# Patient Record
Sex: FEMALE | URBAN - METROPOLITAN AREA
[De-identification: names, ages, dates, MRNs, and addresses within clinical notes are randomized per-mention and may not be internally consistent; named-entity substitution may affect disease eponyms.]

---

## 2022-03-31 ENCOUNTER — INPATIENT (INPATIENT)
Facility: HOSPITAL | Age: 67
LOS: 0 days | Discharge: HOME | DRG: 641 | End: 2022-04-01
Attending: HOSPITALIST | Admitting: HOSPITALIST
Payer: COMMERCIAL

## 2022-03-31 VITALS
TEMPERATURE: 98 F | HEART RATE: 72 BPM | WEIGHT: 279.99 LBS | OXYGEN SATURATION: 96 % | RESPIRATION RATE: 15 BRPM | SYSTOLIC BLOOD PRESSURE: 145 MMHG | HEIGHT: 64 IN | DIASTOLIC BLOOD PRESSURE: 77 MMHG

## 2022-03-31 DIAGNOSIS — R77.8 OTHER SPECIFIED ABNORMALITIES OF PLASMA PROTEINS: ICD-10-CM

## 2022-03-31 DIAGNOSIS — I10 ESSENTIAL (PRIMARY) HYPERTENSION: ICD-10-CM

## 2022-03-31 DIAGNOSIS — R55 SYNCOPE AND COLLAPSE: ICD-10-CM

## 2022-03-31 DIAGNOSIS — E78.5 HYPERLIPIDEMIA, UNSPECIFIED: ICD-10-CM

## 2022-03-31 LAB
ALBUMIN SERPL ELPH-MCNC: 3.6 G/DL — SIGNIFICANT CHANGE UP (ref 3.4–5)
ALP SERPL-CCNC: 88 U/L — SIGNIFICANT CHANGE UP (ref 40–120)
ALT FLD-CCNC: 30 U/L — SIGNIFICANT CHANGE UP (ref 12–42)
ANION GAP SERPL CALC-SCNC: 8 MMOL/L — LOW (ref 9–16)
AST SERPL-CCNC: 28 U/L — SIGNIFICANT CHANGE UP (ref 15–37)
BASOPHILS # BLD AUTO: 0.03 K/UL — SIGNIFICANT CHANGE UP (ref 0–0.2)
BASOPHILS NFR BLD AUTO: 0.3 % — SIGNIFICANT CHANGE UP (ref 0–2)
BILIRUB SERPL-MCNC: 0.3 MG/DL — SIGNIFICANT CHANGE UP (ref 0.2–1.2)
BUN SERPL-MCNC: 16 MG/DL — SIGNIFICANT CHANGE UP (ref 7–23)
CALCIUM SERPL-MCNC: 9.2 MG/DL — SIGNIFICANT CHANGE UP (ref 8.5–10.5)
CHLORIDE SERPL-SCNC: 103 MMOL/L — SIGNIFICANT CHANGE UP (ref 96–108)
CO2 SERPL-SCNC: 27 MMOL/L — SIGNIFICANT CHANGE UP (ref 22–31)
CREAT SERPL-MCNC: 0.75 MG/DL — SIGNIFICANT CHANGE UP (ref 0.5–1.3)
D DIMER BLD IA.RAPID-MCNC: <187 NG/ML DDU — SIGNIFICANT CHANGE UP
EGFR: 88 ML/MIN/1.73M2 — SIGNIFICANT CHANGE UP
EOSINOPHIL # BLD AUTO: 0.08 K/UL — SIGNIFICANT CHANGE UP (ref 0–0.5)
EOSINOPHIL NFR BLD AUTO: 0.8 % — SIGNIFICANT CHANGE UP (ref 0–6)
GLUCOSE SERPL-MCNC: 112 MG/DL — HIGH (ref 70–99)
HCT VFR BLD CALC: 42.2 % — SIGNIFICANT CHANGE UP (ref 34.5–45)
HGB BLD-MCNC: 13.9 G/DL — SIGNIFICANT CHANGE UP (ref 11.5–15.5)
IMM GRANULOCYTES NFR BLD AUTO: 0.2 % — SIGNIFICANT CHANGE UP (ref 0–1.5)
LACTATE SERPL-SCNC: 1 MMOL/L — SIGNIFICANT CHANGE UP (ref 0.4–2)
LIDOCAIN IGE QN: 96 U/L — SIGNIFICANT CHANGE UP (ref 73–393)
LYMPHOCYTES # BLD AUTO: 1.36 K/UL — SIGNIFICANT CHANGE UP (ref 1–3.3)
LYMPHOCYTES # BLD AUTO: 14 % — SIGNIFICANT CHANGE UP (ref 13–44)
MAGNESIUM SERPL-MCNC: 1.7 MG/DL — SIGNIFICANT CHANGE UP (ref 1.6–2.6)
MCHC RBC-ENTMCNC: 27.9 PG — SIGNIFICANT CHANGE UP (ref 27–34)
MCHC RBC-ENTMCNC: 32.9 GM/DL — SIGNIFICANT CHANGE UP (ref 32–36)
MCV RBC AUTO: 84.6 FL — SIGNIFICANT CHANGE UP (ref 80–100)
MONOCYTES # BLD AUTO: 0.6 K/UL — SIGNIFICANT CHANGE UP (ref 0–0.9)
MONOCYTES NFR BLD AUTO: 6.2 % — SIGNIFICANT CHANGE UP (ref 2–14)
NEUTROPHILS # BLD AUTO: 7.6 K/UL — HIGH (ref 1.8–7.4)
NEUTROPHILS NFR BLD AUTO: 78.5 % — HIGH (ref 43–77)
NRBC # BLD: 0 /100 WBCS — SIGNIFICANT CHANGE UP (ref 0–0)
PLATELET # BLD AUTO: 226 K/UL — SIGNIFICANT CHANGE UP (ref 150–400)
POTASSIUM SERPL-MCNC: 3.8 MMOL/L — SIGNIFICANT CHANGE UP (ref 3.5–5.3)
POTASSIUM SERPL-SCNC: 3.8 MMOL/L — SIGNIFICANT CHANGE UP (ref 3.5–5.3)
PROT SERPL-MCNC: 6.4 G/DL — SIGNIFICANT CHANGE UP (ref 6.4–8.2)
RBC # BLD: 4.99 M/UL — SIGNIFICANT CHANGE UP (ref 3.8–5.2)
RBC # FLD: 13.1 % — SIGNIFICANT CHANGE UP (ref 10.3–14.5)
SARS-COV-2 RNA SPEC QL NAA+PROBE: SIGNIFICANT CHANGE UP
SODIUM SERPL-SCNC: 138 MMOL/L — SIGNIFICANT CHANGE UP (ref 132–145)
TROPONIN I, HIGH SENSITIVITY RESULT: 95.4 NG/L — HIGH
TROPONIN I, HIGH SENSITIVITY RESULT: 98.9 NG/L — HIGH
WBC # BLD: 9.69 K/UL — SIGNIFICANT CHANGE UP (ref 3.8–10.5)
WBC # FLD AUTO: 9.69 K/UL — SIGNIFICANT CHANGE UP (ref 3.8–10.5)

## 2022-03-31 PROCEDURE — 99285 EMERGENCY DEPT VISIT HI MDM: CPT

## 2022-03-31 PROCEDURE — 71045 X-RAY EXAM CHEST 1 VIEW: CPT | Mod: 26

## 2022-03-31 PROCEDURE — 93010 ELECTROCARDIOGRAM REPORT: CPT

## 2022-03-31 PROCEDURE — 99223 1ST HOSP IP/OBS HIGH 75: CPT

## 2022-03-31 RX ORDER — ASPIRIN/CALCIUM CARB/MAGNESIUM 324 MG
325 TABLET ORAL ONCE
Refills: 0 | Status: COMPLETED | OUTPATIENT
Start: 2022-03-31 | End: 2022-03-31

## 2022-03-31 RX ORDER — SODIUM CHLORIDE 9 MG/ML
1000 INJECTION INTRAMUSCULAR; INTRAVENOUS; SUBCUTANEOUS ONCE
Refills: 0 | Status: COMPLETED | OUTPATIENT
Start: 2022-03-31 | End: 2022-03-31

## 2022-03-31 RX ADMIN — Medication 325 MILLIGRAM(S): at 20:17

## 2022-03-31 RX ADMIN — SODIUM CHLORIDE 2000 MILLILITER(S): 9 INJECTION INTRAMUSCULAR; INTRAVENOUS; SUBCUTANEOUS at 19:34

## 2022-03-31 NOTE — H&P ADULT - NSHPLABSRESULTS_GEN_ALL_CORE
13.9   9.69  )-----------( 226      ( 31 Mar 2022 19:36 )             42.2       03-31    138  |  103  |  16  ----------------------------<  112<H>  3.8   |  27  |  0.75    Ca    9.2      31 Mar 2022 19:36  Mg     1.7     03-31    TPro  6.4  /  Alb  3.6  /  TBili  0.3  /  DBili  x   /  AST  28  /  ALT  30  /  AlkPhos  88  03-31                    EKG: NSR@71bpm with 1st degree AVB, LVH and  non specific ST-T wave changes

## 2022-03-31 NOTE — H&P ADULT - PROBLEM SELECTOR PLAN 4
continue HOME MED:  --F/U lipid panel. continue HOME MED: Simvastatin 20mg PO qhs.  --F/U lipid panel.

## 2022-03-31 NOTE — H&P ADULT - NSICDXPASTSURGICALHX_GEN_ALL_CORE_FT
PAST SURGICAL HISTORY:  No significant past surgical history      PAST SURGICAL HISTORY:  S/P cholecystectomy     S/P rotator cuff repair

## 2022-03-31 NOTE — H&P ADULT - PROBLEM SELECTOR PLAN 2
Troponin I elevated 98.9 with second set 95.4, patient remains chest pain free.  --will obtain repeat Cardiac enzymes including Troponin T, CK and CKMB upon arrival to [facility].  --F/U Echo results. Troponin I elevated 98.9 with second set 95.4, patient remains chest pain free. EKG nonischemic.  --will obtain repeat Cardiac enzymes including Troponin T, CK and CKMB upon arrival to [facility].  --F/U Echo results.

## 2022-03-31 NOTE — H&P ADULT - NSHPSOCIALHISTORY_GEN_ALL_CORE
Tobacco/ETOH/illicit drugs: denies Tobacco: former smoker  ETOH: social drinker  illicit drugs: denies

## 2022-03-31 NOTE — ED PROVIDER NOTE - ATTENDING CONTRIBUTION TO CARE
I have seen the pt, reviewed all pertinent clinical data, and I agree with the documentation/care/plan executed by JULITO Lakhani.

## 2022-03-31 NOTE — H&P ADULT - NSHPPHYSICALEXAM_GEN_ALL_CORE
T(C): 36.6 (03-31-22 @ 21:54), Max: 36.9 (03-31-22 @ 18:59)  HR: 68 (03-31-22 @ 21:54) (68 - 72)  BP: 145/83 (03-31-22 @ 21:54) (145/77 - 145/83)  RR: 16 (03-31-22 @ 21:54) (15 - 16)  SpO2: 95% (03-31-22 @ 21:54) (95% - 96%)  Wt(kg): --    Appearance: Normal	  HEENT:   Normal oral mucosa, PERRL, EOMI	  Neck: Supple, + JVD/ - JVD; No Carotid Bruit and 2+ pulses B/L  Cardiovascular: Normal S1 S2, No JVD, No murmurs  Respiratory: Lungs clear to auscultation/Decreased Breath Sounds/No Rales, Rhonchi, Wheezing	  Gastrointestinal:  Soft, Non-tender, + BS	  Skin: No rashes, No ecchymoses, No cyanosis  Extremities: Normal range of motion, No clubbing, cyanosis or edema  Vascular: Femoral pulses 2+ b/l without bruit, DP 1+ b/l, PT 1+ b/l  Neurologic: Non-focal  Psychiatry: A & O x 3, Mood & affect appropriate

## 2022-03-31 NOTE — H&P ADULT - NSICDXPASTMEDICALHX_GEN_ALL_CORE_FT
PAST MEDICAL HISTORY:  Diverticulitis     History of depression     Hyperlipidemia     Hypertension      PAST MEDICAL HISTORY:  ASD (atrial septal defect)     Diverticulitis     History of depression     Hyperlipidemia     Hypertension

## 2022-03-31 NOTE — H&P ADULT - PROBLEM SELECTOR PLAN 3
stable, continue HOME MEDS: stable, continue HOME MEDS: Enalapril 20mg PO daily and HCTZ 12.5mg PO daily (on Bendroflumethiazide 2.5mg PO daily at home--not on formulary)

## 2022-03-31 NOTE — H&P ADULT - HISTORY OF PRESENT ILLNESS
65 y/o female visiting from [place] since 3/14/22 with PMHx of Diverticulitis, HTN, Hyperlipidemia, and Depression presents as a transfer this morning, 4/1/22, to [facility] under cardiology after two witnessed syncopal episodes with LOC lasting 30 seconds each with prodrome symptoms of weakness and lightheadedness.       In speaking with patient, she reports eating a meal at a restaurant when she felt that she had the urge to have a bowlmovement and had at least 5 episodes of diarrhea. states that after episodes patient felt weak and light headed. as she and her daughter were taking the elevator to exit the restaurant she felt light headed and syncopized for a few seconds. she came to again and had another syncopal episodes lasting a few seconds on the street. patient's daughter as able to lower her to the floor before she could fall. denies chestpain, palpitations, SOB, abdominal pain, nausea, vomiting.  3 aspirins on the day of her trip but does not take 66 yr old F, visiting from [place] since 3/14/22 with PMHx of HTN, hyperlipidemia, depression, diverticulitis who presents as a transfer this morning, 4/1/22, to [facility] under cardiology after two witnessed syncopal episodes with LOC lasting 30 seconds each with prodrome symptoms of weakness and lightheadedness.       In speaking with patient, she reports eating a meal at a restaurant when she felt that she had the urge to have a bowel movement and had at least 5 episodes of diarrhea. Patient recalls walking to the elevator to exit the restaurant when she felt weak and lightheaded, per patients daughter patient syncopized and had LOC for a few seconds. Patient came to again and had another syncopal episodes lasting a few seconds on the street. As per patient's daughter, each time she was able to lower her to the floor before she could fall. Patient denies any N/V, diaphoresis, palpitations, chest pain, SOB, abdominal pain or similar symptoms in the past.      [facility]77, HR: 70s, RR:16, Temp: 98.4F, O2 sat: 96% RA. EKG revealed NSR@71BPM, no acute ST/T wave changes. CXR unremarkable. Labs notable for: D-dimer <187, Troponin I elevated 98.9 with second set 95.4, lactate within normal limits, COVID PCR negative.     Patient treated with 1 liter IVF bolus and ASA 325mg PO x 1 dose.    Patient currently stable, transferred to [facility] for cardiac telemetry, serial cardiac enzymes and further cardiac work-up.    66 yr old F, visiting from [place] since 3/14/22 with PMHx of HTN, hyperlipidemia, DM, asthma (denies hospitalization/intubations), depression, hx of diverticulitis, hx ASD (spontaneously closed in her 20s), recent cholecystectomy 11/2021 who presents as a transfer this morning, 4/1/22, to [facility] under cardiology after two witnessed syncopal episodes with LOC lasting 30 seconds each with prodrome symptoms of weakness and lightheadedness.       In speaking with patient, she reports eating a fatty/heavy meal at a restaurant this afternoon when she felt that she had the urge to have a bowel movement and had at least 5 episodes of diarrhea. Patient recalls walking to the elevator to exit the restaurant when she felt weak and lightheaded, per patients daughter patient syncopized and had LOC for a few seconds. Patient came to again and had another syncopal episodes lasting a few seconds on the street. As per patient's daughter, each time she was able to lower her to the floor before she could fall. Patient denies any N/V, diaphoresis, palpitations, chest pain, SOB, abdominal pain or similar symptoms in the past.      [facility]77, HR: 70s, RR:16, Temp: 98.4F, O2 sat: 96% RA. EKG revealed NSR@71BPM, no acute ST/T wave changes. CXR unremarkable. Labs notable for: D-dimer <187, Troponin I elevated 98.9 with second set 95.4, lactate within normal limits, COVID PCR negative. Patient treated with 1 liter IVF bolus and ASA 325mg PO x 1 dose.    Patient currently stable, transferred to [facility] for cardiac telemetry, serial cardiac enzymes and further cardiac work-up.    66 yr old F, visiting from [place] since 3/14/22 with PMHx of HTN, hyperlipidemia, DM, asthma (denies hospitalization/intubations), depression, hx of diverticulitis, hx ASD (spontaneously closed in her 20s), recent cholecystectomy 11/2021 who presents as a transfer this morning, 4/1/22, to [facility] under cardiology after two witnessed syncopal episodes with LOC lasting 30 seconds each with prodrome symptoms of weakness and lightheadedness.       In speaking with patient, she reports eating a fatty/heavy meal at a restaurant this afternoon when she felt that she had the urge to have a bowel movement and had at least 5 episodes of diarrhea. Patient recalls walking to the elevator to exit the restaurant when she felt weak and lightheaded, per patients daughter patient syncopized and had LOC for a few seconds. Patient came to again and had another syncopal episodes lasting a few seconds on the street. As per patient's daughter, each time she was able to lower her to the floor before she could fall. Patient denies any N/V, diaphoresis, palpitations, chest pain, SOB, abdominal pain or similar symptoms in the past. Patient denies prior cardiac work-up, states she only had a EKG prior to cholecystectomy in [facility]2021.      [facility]: [facility]77, HR: 70s, RR:16, Temp: 98.4F, O2 sat: 96% RA. EKG revealed NSR@71BPM, no acute ST/T wave changes. CXR unremarkable. Labs notable for: D-dimer <187, Troponin I elevated 98.9 with second set 95.4, lactate within normal limits, COVID PCR negative. Patient treated with 1 liter IVF bolus and ASA 325mg PO x 1 dose.    Patient currently stable, transferred to [facility] for cardiac telemetry, serial cardiac enzymes and further cardiac work-up.

## 2022-03-31 NOTE — ED PROVIDER NOTE - NS ED ATTENDING STATEMENT MOD
This was a shared visit with the MAYNOR. I reviewed and verified the documentation and independently performed the documented:

## 2022-03-31 NOTE — H&P ADULT - ASSESSMENT
66 yr old F, visiting from [place] since 3/14/22 with PMHx of HTN, hyperlipidemia, depression, diverticulitis who [facility] s/p two witnessed syncopal episodes with LOC lasting 30 seconds each with prodrome symptoms of weakness and lightheadedness, EKG nonischemic, transferred to [facility] for cardiac telemetry, serial cardiac enzymes and further cardiac work-up.    66 yr old F, visiting from [place] since 3/14/22 with PMHx of HTN, hyperlipidemia, DM, depression who initially presented to [facility] 3/31/22 s/p two witnessed syncopal episodes with LOC lasting 30 seconds each with prodrome symptoms of weakness and lightheadedness, EKG nonischemic, transferred to [facility] for cardiac telemetry, serial cardiac enzymes and further cardiac work-up.

## 2022-03-31 NOTE — ED PROVIDER NOTE - CLINICAL SUMMARY MEDICAL DECISION MAKING FREE TEXT BOX
Patient PMHX HTN, diverticulitis, cholecystectomy BIB EMS after 2 syncopal episodes in the setting of 5 episodes of watery diarrhea. will, check labs, ekg, give IV hydration and continue to monitor

## 2022-03-31 NOTE — ED ADULT NURSE NOTE - OBJECTIVE STATEMENT
Pt presents to ED sp two syncopal episodes after eating at a restaurant this evening. Pt endorses multiple episodes of diarrhea today as well. Hx of gallbladder removal in November and non compliant with low fat diet. Denies headstrike with syncope, CP, SOB, abd pain or dizziness.

## 2022-03-31 NOTE — ED ADULT TRIAGE NOTE - CHIEF COMPLAINT QUOTE
abdominal pain and diarrhea (intermittent episodes of diarrhea x 3-4 months), 2 witnessed syncopal episodes (apx 30 seconds each) at 1500 today (pmh of diverticulitis)

## 2022-03-31 NOTE — H&P ADULT - PROBLEM SELECTOR PLAN 1
currently asymptomatic, EKG revealed NSR@71BPM without acute ST/T wave changes. CXR unremarkable. D-dimer within normal limits.  --continue cardiac telemetry.  --F/U orthostatics.  --obtain Echocardiogram for structural assessment. currently asymptomatic, EKG revealed NSR@71BPM without acute ST/T wave changes. CXR unremarkable. D-dimer within normal limits.  --continue cardiac telemetry.  --F/U orthostatics.  --obtain Echocardiogram for structural assessment. (given reported hx of ASD)

## 2022-03-31 NOTE — H&P ADULT - PROBLEM SELECTOR PLAN 5
patient admits to 5 episodes of diarrhea after a meal this afternoon.  --afebrile, no leukocytosis, lactate within normal limits.  --will continue to monitor and consider stool culture PRN.      N: NPO pending further work-up  E: Maintain K >4.0 and Mg >2.0  VTE PPx: Lovenox subcut  Code: Full patient admits to 5 episodes of diarrhea after a meal this afternoon, states this is chronic and has been occurring after heavy/fatty meals since her cholecystectomy.  --afebrile, no leukocytosis, lactate within normal limits.  --will continue to monitor and consider stool culture PRN.      N: NPO pending further work-up  E: Maintain K >4.0 and Mg >2.0  VTE PPx: Lovenox subcut  Code: Full

## 2022-03-31 NOTE — ED PROVIDER NOTE - OBJECTIVE STATEMENT
PMHX diverticulitis, HTN, hyperlipidemia, depression, cholecystectomy BIB EMS for 2 sycnopal episodes. patient had been eating a meal at a restaurant when she felt that she had the urge to have a bowlmovement and had at least 5 episodes of diarrhea. states that after episodes patient felt weak and light headed. as she and her daughter were taking the elevator to exit the restaurant she felt light headed and syncopized for a few seconds. she came to again and had another syncopal episodes lasting a few seconds on the street. patient's daughter as able to lower her to the floor before she could fall. denies chestpain, palpitations, SOB, abdominal pain, nausea, vomiting. states that she is visiting from [place] arrived in  3/14/2022. had taken 3 aspirins on the day of her trip but does not take any on a regular basis.

## 2022-04-01 VITALS — TEMPERATURE: 98 F

## 2022-04-01 DIAGNOSIS — R19.7 DIARRHEA, UNSPECIFIED: ICD-10-CM

## 2022-04-01 DIAGNOSIS — Z90.49 ACQUIRED ABSENCE OF OTHER SPECIFIED PARTS OF DIGESTIVE TRACT: Chronic | ICD-10-CM

## 2022-04-01 DIAGNOSIS — Z98.890 OTHER SPECIFIED POSTPROCEDURAL STATES: Chronic | ICD-10-CM

## 2022-04-01 LAB
ANION GAP SERPL CALC-SCNC: 14 MMOL/L — SIGNIFICANT CHANGE UP (ref 5–17)
BUN SERPL-MCNC: 15 MG/DL — SIGNIFICANT CHANGE UP (ref 7–23)
CALCIUM SERPL-MCNC: 9.2 MG/DL — SIGNIFICANT CHANGE UP (ref 8.4–10.5)
CHLORIDE SERPL-SCNC: 104 MMOL/L — SIGNIFICANT CHANGE UP (ref 96–108)
CHOLEST SERPL-MCNC: 173 MG/DL — SIGNIFICANT CHANGE UP
CK MB CFR SERPL CALC: 7.4 NG/ML — HIGH (ref 0–6.7)
CK MB CFR SERPL CALC: 7.5 NG/ML — HIGH (ref 0–6.7)
CK SERPL-CCNC: 149 U/L — SIGNIFICANT CHANGE UP (ref 25–170)
CK SERPL-CCNC: 149 U/L — SIGNIFICANT CHANGE UP (ref 25–170)
CO2 SERPL-SCNC: 23 MMOL/L — SIGNIFICANT CHANGE UP (ref 22–31)
CREAT SERPL-MCNC: 0.63 MG/DL — SIGNIFICANT CHANGE UP (ref 0.5–1.3)
EGFR: 98 ML/MIN/1.73M2 — SIGNIFICANT CHANGE UP
GLUCOSE BLDC GLUCOMTR-MCNC: 106 MG/DL — HIGH (ref 70–99)
GLUCOSE BLDC GLUCOMTR-MCNC: 89 MG/DL — SIGNIFICANT CHANGE UP (ref 70–99)
GLUCOSE BLDC GLUCOMTR-MCNC: 94 MG/DL — SIGNIFICANT CHANGE UP (ref 70–99)
GLUCOSE SERPL-MCNC: 101 MG/DL — HIGH (ref 70–99)
HCT VFR BLD CALC: 41.2 % — SIGNIFICANT CHANGE UP (ref 34.5–45)
HDLC SERPL-MCNC: 59 MG/DL — SIGNIFICANT CHANGE UP
HGB BLD-MCNC: 13.3 G/DL — SIGNIFICANT CHANGE UP (ref 11.5–15.5)
LIPID PNL WITH DIRECT LDL SERPL: 92 MG/DL — SIGNIFICANT CHANGE UP
MAGNESIUM SERPL-MCNC: 1.7 MG/DL — SIGNIFICANT CHANGE UP (ref 1.6–2.6)
MCHC RBC-ENTMCNC: 27.3 PG — SIGNIFICANT CHANGE UP (ref 27–34)
MCHC RBC-ENTMCNC: 32.3 GM/DL — SIGNIFICANT CHANGE UP (ref 32–36)
MCV RBC AUTO: 84.6 FL — SIGNIFICANT CHANGE UP (ref 80–100)
NON HDL CHOLESTEROL: 114 MG/DL — SIGNIFICANT CHANGE UP
NRBC # BLD: 0 /100 WBCS — SIGNIFICANT CHANGE UP (ref 0–0)
PLATELET # BLD AUTO: 228 K/UL — SIGNIFICANT CHANGE UP (ref 150–400)
POTASSIUM SERPL-MCNC: 3.7 MMOL/L — SIGNIFICANT CHANGE UP (ref 3.5–5.3)
POTASSIUM SERPL-SCNC: 3.7 MMOL/L — SIGNIFICANT CHANGE UP (ref 3.5–5.3)
RBC # BLD: 4.87 M/UL — SIGNIFICANT CHANGE UP (ref 3.8–5.2)
RBC # FLD: 13 % — SIGNIFICANT CHANGE UP (ref 10.3–14.5)
SODIUM SERPL-SCNC: 141 MMOL/L — SIGNIFICANT CHANGE UP (ref 135–145)
TRIGL SERPL-MCNC: 108 MG/DL — SIGNIFICANT CHANGE UP
TROPONIN T SERPL-MCNC: 0.01 NG/ML — SIGNIFICANT CHANGE UP (ref 0–0.01)
TROPONIN T SERPL-MCNC: <0.01 NG/ML — SIGNIFICANT CHANGE UP (ref 0–0.01)
TSH SERPL-MCNC: 1.77 UIU/ML — SIGNIFICANT CHANGE UP (ref 0.27–4.2)
WBC # BLD: 7.42 K/UL — SIGNIFICANT CHANGE UP (ref 3.8–10.5)
WBC # FLD AUTO: 7.42 K/UL — SIGNIFICANT CHANGE UP (ref 3.8–10.5)

## 2022-04-01 PROCEDURE — 84484 ASSAY OF TROPONIN QUANT: CPT

## 2022-04-01 PROCEDURE — 84443 ASSAY THYROID STIM HORMONE: CPT

## 2022-04-01 PROCEDURE — 71045 X-RAY EXAM CHEST 1 VIEW: CPT

## 2022-04-01 PROCEDURE — 83735 ASSAY OF MAGNESIUM: CPT

## 2022-04-01 PROCEDURE — 85025 COMPLETE CBC W/AUTO DIFF WBC: CPT

## 2022-04-01 PROCEDURE — 80048 BASIC METABOLIC PNL TOTAL CA: CPT

## 2022-04-01 PROCEDURE — 80061 LIPID PANEL: CPT

## 2022-04-01 PROCEDURE — 83605 ASSAY OF LACTIC ACID: CPT

## 2022-04-01 PROCEDURE — 85027 COMPLETE CBC AUTOMATED: CPT

## 2022-04-01 PROCEDURE — 82550 ASSAY OF CK (CPK): CPT

## 2022-04-01 PROCEDURE — 99285 EMERGENCY DEPT VISIT HI MDM: CPT

## 2022-04-01 PROCEDURE — 93306 TTE W/DOPPLER COMPLETE: CPT

## 2022-04-01 PROCEDURE — 99239 HOSP IP/OBS DSCHRG MGMT >30: CPT

## 2022-04-01 PROCEDURE — 83690 ASSAY OF LIPASE: CPT

## 2022-04-01 PROCEDURE — 85379 FIBRIN DEGRADATION QUANT: CPT

## 2022-04-01 PROCEDURE — 80053 COMPREHEN METABOLIC PANEL: CPT

## 2022-04-01 PROCEDURE — 82962 GLUCOSE BLOOD TEST: CPT

## 2022-04-01 PROCEDURE — 93005 ELECTROCARDIOGRAM TRACING: CPT

## 2022-04-01 PROCEDURE — 93306 TTE W/DOPPLER COMPLETE: CPT | Mod: 26

## 2022-04-01 PROCEDURE — 87635 SARS-COV-2 COVID-19 AMP PRB: CPT

## 2022-04-01 PROCEDURE — 82553 CREATINE MB FRACTION: CPT

## 2022-04-01 PROCEDURE — 36415 COLL VENOUS BLD VENIPUNCTURE: CPT

## 2022-04-01 RX ORDER — GLUCAGON INJECTION, SOLUTION 0.5 MG/.1ML
1 INJECTION, SOLUTION SUBCUTANEOUS ONCE
Refills: 0 | Status: DISCONTINUED | OUTPATIENT
Start: 2022-04-01 | End: 2022-04-01

## 2022-04-01 RX ORDER — METFORMIN HYDROCHLORIDE 850 MG/1
1 TABLET ORAL
Qty: 0 | Refills: 0 | DISCHARGE

## 2022-04-01 RX ORDER — DEXTROSE 50 % IN WATER 50 %
25 SYRINGE (ML) INTRAVENOUS ONCE
Refills: 0 | Status: DISCONTINUED | OUTPATIENT
Start: 2022-04-01 | End: 2022-04-01

## 2022-04-01 RX ORDER — SIMVASTATIN 20 MG/1
1 TABLET, FILM COATED ORAL
Qty: 0 | Refills: 0 | DISCHARGE

## 2022-04-01 RX ORDER — CHOLECALCIFEROL (VITAMIN D3) 125 MCG
2000 CAPSULE ORAL DAILY
Refills: 0 | Status: DISCONTINUED | OUTPATIENT
Start: 2022-04-01 | End: 2022-04-01

## 2022-04-01 RX ORDER — DEXTROSE 50 % IN WATER 50 %
15 SYRINGE (ML) INTRAVENOUS ONCE
Refills: 0 | Status: DISCONTINUED | OUTPATIENT
Start: 2022-04-01 | End: 2022-04-01

## 2022-04-01 RX ORDER — INSULIN LISPRO 100/ML
VIAL (ML) SUBCUTANEOUS
Refills: 0 | Status: DISCONTINUED | OUTPATIENT
Start: 2022-04-01 | End: 2022-04-01

## 2022-04-01 RX ORDER — OMEPRAZOLE 10 MG/1
1 CAPSULE, DELAYED RELEASE ORAL
Qty: 0 | Refills: 0 | DISCHARGE

## 2022-04-01 RX ORDER — DEXTROSE 50 % IN WATER 50 %
12.5 SYRINGE (ML) INTRAVENOUS ONCE
Refills: 0 | Status: DISCONTINUED | OUTPATIENT
Start: 2022-04-01 | End: 2022-04-01

## 2022-04-01 RX ORDER — SODIUM CHLORIDE 9 MG/ML
1000 INJECTION, SOLUTION INTRAVENOUS
Refills: 0 | Status: DISCONTINUED | OUTPATIENT
Start: 2022-04-01 | End: 2022-04-01

## 2022-04-01 RX ORDER — IPRATROPIUM BROMIDE 0.2 MG/ML
2 SOLUTION, NON-ORAL INHALATION
Qty: 0 | Refills: 0 | DISCHARGE

## 2022-04-01 RX ORDER — PANTOPRAZOLE SODIUM 20 MG/1
40 TABLET, DELAYED RELEASE ORAL
Refills: 0 | Status: DISCONTINUED | OUTPATIENT
Start: 2022-04-01 | End: 2022-04-01

## 2022-04-01 RX ORDER — POTASSIUM CHLORIDE 20 MEQ
40 PACKET (EA) ORAL ONCE
Refills: 0 | Status: COMPLETED | OUTPATIENT
Start: 2022-04-01 | End: 2022-04-01

## 2022-04-01 RX ORDER — MECLIZINE HCL 12.5 MG
1 TABLET ORAL
Qty: 90 | Refills: 0
Start: 2022-04-01 | End: 2022-04-30

## 2022-04-01 RX ORDER — MAGNESIUM SULFATE 500 MG/ML
2 VIAL (ML) INJECTION ONCE
Refills: 0 | Status: COMPLETED | OUTPATIENT
Start: 2022-04-01 | End: 2022-04-01

## 2022-04-01 RX ORDER — CHOLESTYRAMINE 4 G/9G
4 POWDER, FOR SUSPENSION ORAL
Qty: 120 | Refills: 0
Start: 2022-04-01 | End: 2022-04-30

## 2022-04-01 RX ORDER — ALBUTEROL 90 UG/1
2 AEROSOL, METERED ORAL EVERY 6 HOURS
Refills: 0 | Status: DISCONTINUED | OUTPATIENT
Start: 2022-04-01 | End: 2022-04-01

## 2022-04-01 RX ORDER — HYDROCHLOROTHIAZIDE 25 MG
12.5 TABLET ORAL DAILY
Refills: 0 | Status: DISCONTINUED | OUTPATIENT
Start: 2022-04-01 | End: 2022-04-01

## 2022-04-01 RX ORDER — ENOXAPARIN SODIUM 100 MG/ML
40 INJECTION SUBCUTANEOUS EVERY 12 HOURS
Refills: 0 | Status: DISCONTINUED | OUTPATIENT
Start: 2022-04-01 | End: 2022-04-01

## 2022-04-01 RX ORDER — SIMVASTATIN 20 MG/1
20 TABLET, FILM COATED ORAL AT BEDTIME
Refills: 0 | Status: DISCONTINUED | OUTPATIENT
Start: 2022-04-01 | End: 2022-04-01

## 2022-04-01 RX ORDER — CHOLESTYRAMINE 4 G/9G
4 POWDER, FOR SUSPENSION ORAL DAILY
Refills: 0 | Status: DISCONTINUED | OUTPATIENT
Start: 2022-04-01 | End: 2022-04-01

## 2022-04-01 RX ORDER — FLUOXETINE HCL 10 MG
1 CAPSULE ORAL
Qty: 0 | Refills: 0 | DISCHARGE

## 2022-04-01 RX ORDER — MECLIZINE HCL 12.5 MG
25 TABLET ORAL THREE TIMES A DAY
Refills: 0 | Status: DISCONTINUED | OUTPATIENT
Start: 2022-04-01 | End: 2022-04-01

## 2022-04-01 RX ORDER — ALBUTEROL 90 UG/1
2 AEROSOL, METERED ORAL
Qty: 0 | Refills: 0 | DISCHARGE

## 2022-04-01 RX ORDER — CHOLECALCIFEROL (VITAMIN D3) 125 MCG
1 CAPSULE ORAL
Qty: 0 | Refills: 0 | DISCHARGE

## 2022-04-01 RX ADMIN — Medication 25 GRAM(S): at 10:08

## 2022-04-01 RX ADMIN — Medication 12.5 MILLIGRAM(S): at 05:31

## 2022-04-01 RX ADMIN — ENOXAPARIN SODIUM 40 MILLIGRAM(S): 100 INJECTION SUBCUTANEOUS at 05:31

## 2022-04-01 RX ADMIN — Medication 40 MILLIEQUIVALENT(S): at 10:08

## 2022-04-01 RX ADMIN — PANTOPRAZOLE SODIUM 40 MILLIGRAM(S): 20 TABLET, DELAYED RELEASE ORAL at 05:31

## 2022-04-01 RX ADMIN — CHOLESTYRAMINE 4 GRAM(S): 4 POWDER, FOR SUSPENSION ORAL at 15:58

## 2022-04-01 RX ADMIN — Medication 20 MILLIGRAM(S): at 05:31

## 2022-04-01 RX ADMIN — Medication 2000 UNIT(S): at 13:22

## 2022-04-01 NOTE — DISCHARGE NOTE PROVIDER - HOSPITAL COURSE
66 yr old F, visiting from [place] since 3/14/22 with PMHx of HTN, hyperlipidemia, DM, asthma (denies hospitalization/intubations), depression, hx of diverticulitis, hx ASD (spontaneously closed in her 20s), recent cholecystectomy 11/2021 presented to [facility] from [facility] 4/1/22 s/p two witnessed syncopal episodes with LOC lasting 30 seconds each with prodrome symptoms of weakness and lightheadedness.   In [facility], BP: 145/77, HR: 70s, RR:16, Temp: 98.4F, O2 sat: 96% RA. EKG revealed NSR@71BPM, no acute ST/T wave changes. CXR unremarkable. Labs notable for: D-dimer <187, Troponin I elevated 98.9 with second set 95.4, lactate within normal limits, COVID PCR negative. Patient treated with 1 liter IVF bolus and ASA 325mg PO x 1 dose.    During hospitalization, Trop (-) x ___, orthostatics negative. ECHO performed s/f ____    Syncope most likely 2/2 dehydration s/p 5 episode of diarrhea and she remains hemodynamically stable as no arrhthymias noted on tele.     On the day of discharge, the patient was seen and examined. Symptoms improved. Vital signs are stable. Labs and imaging reviewed. Patient is medically optimized and hemodynamically stable. Return precautions discussed, medication teach back done w/ patient, and importance of physician followup emphasized for which she verbalized understanding.        66 yr old F, visiting from [place] since 3/14/22 with PMHx of HTN, hyperlipidemia, DM, asthma (denies hospitalization/intubations), depression, hx of diverticulitis, hx ASD (spontaneously closed in her 20s), recent cholecystectomy 11/2021 presented to [facility] from [facility] 4/1/22 s/p two witnessed syncopal episodes with LOC lasting 30 seconds each with prodrome symptoms of weakness and lightheadedness.   In [facility], BP: 145/77, HR: 70s, RR:16, Temp: 98.4F, O2 sat: 96% RA. EKG revealed NSR@71BPM, no acute ST/T wave changes. CXR unremarkable. Labs notable for: D-dimer <187, Troponin I elevated 98.9 with second set 95.4, lactate within normal limits, COVID PCR negative. Patient treated with 1 liter IVF bolus and ASA 325mg PO x 1 dose.    During hospitalization, Trop (-) x 2, orthostatics negative. ECHO performed s/f ____    Syncope most likely 2/2 dehydration s/p 5 episode of diarrhea and she remains hemodynamically stable as no arrhthymias noted on tele.     On the day of discharge, the patient was seen and examined. Symptoms improved. Vital signs are stable. Labs and imaging reviewed. Patient is medically optimized and hemodynamically stable. Return precautions discussed, medication teach back done w/ patient, and importance of physician followup emphasized for which she verbalized understanding.        66 yr old F, visiting from [place] since 3/14/22 with PMHx of HTN, hyperlipidemia, DM, asthma (denies hospitalization/intubations), depression, hx of diverticulitis, hx ASD (spontaneously closed in her 20s), recent cholecystectomy 11/2021 presented to [facility] from [facility] 4/1/22 s/p two witnessed syncopal episodes with LOC lasting 30 seconds each with prodrome symptoms of weakness and lightheadedness.   In [facility], BP: 145/77, HR: 70s, RR:16, Temp: 98.4F, O2 sat: 96% RA. EKG revealed NSR@71BPM, no acute ST/T wave changes. CXR unremarkable. Labs notable for: D-dimer <187, Troponin I elevated 98.9 with second set 95.4, lactate within normal limits, COVID PCR negative. Patient treated with 1 liter IVF bolus and ASA 325mg PO x 1 dose.    During hospitalization, Trop (-) x 2, orthostatics negative. ECHO performed s/f Hyperdynamic left ventricular systolic function. Normal right ventricular size and systolic function. Normal atria. Mild aortic stenosis.  Syncope most likely 2/2 dehydration s/p 5 episode of diarrhea and she remains hemodynamically stable and has had no arrhthymias noted on tele.     On the day of discharge, the patient was seen and examined. Symptoms improved. Vital signs are stable. Labs and imaging reviewed. Patient is medically optimized and hemodynamically stable. Return precautions discussed, medication teach back done w/ patient, and importance of physician followup emphasized for which she verbalized understanding.

## 2022-04-01 NOTE — DISCHARGE NOTE PROVIDER - NSDCMRMEDTOKEN_GEN_ALL_CORE_FT
Albuterol (Eqv-ProAir HFA) 90 mcg/inh inhalation aerosol: 2 puff(s) inhaled every 6 hours, As Needed  enalapril 20 mg oral tablet: 1 tab(s) orally once a day  FLUoxetine 20 mg oral capsule: 1 cap(s) orally once a day  hydroCHLOROthiazide 12.5 mg oral capsule: 1 cap(s) orally once a day (patient on bendroflumethiazide 2.5mg PO daily at home)  MetFORMIN (Eqv-Fortamet) 500 mg oral tablet, extended release: 1 tab(s) orally once a day  omeprazole 20 mg oral delayed release capsule: 1 cap(s) orally once a day  simvastatin 20 mg oral tablet: 1 tab(s) orally once a day (at bedtime)  Vitamin D3 50 mcg (2000 intl units) oral tablet: 1 tab(s) orally once a day   Albuterol (Eqv-ProAir HFA) 90 mcg/inh inhalation aerosol: 2 puff(s) inhaled every 6 hours, As Needed  cholestyramine 4 g/5 g oral powder for reconstitution: 4 gram(s) orally once a day   enalapril 20 mg oral tablet: 1 tab(s) orally once a day  FLUoxetine 20 mg oral capsule: 1 cap(s) orally once a day  hydroCHLOROthiazide 12.5 mg oral capsule: 1 cap(s) orally once a day (patient on bendroflumethiazide 2.5mg PO daily at home)  meclizine 25 mg oral tablet: 1 tab(s) orally 3 times a day, As needed, Dizziness  MetFORMIN (Eqv-Fortamet) 500 mg oral tablet, extended release: 1 tab(s) orally once a day  omeprazole 20 mg oral delayed release capsule: 1 cap(s) orally once a day  simvastatin 20 mg oral tablet: 1 tab(s) orally once a day (at bedtime)  Vitamin D3 50 mcg (2000 intl units) oral tablet: 1 tab(s) orally once a day

## 2022-04-01 NOTE — DISCHARGE NOTE PROVIDER - NSDCCPCAREPLAN_GEN_ALL_CORE_FT
PRINCIPAL DISCHARGE DIAGNOSIS  Diagnosis: Syncope  Assessment and Plan of Treatment: - You presented to the hospital after you had a syncopal episode or in other words fainted. Multiple tests were performed while you were in the hospital. You DID NOT have a heart attack and NO arrhythmias or irregular heart beats were noted on telemetry. An echocardiogram or ultrasound of your heart was performed which showed a normal pumping function or ejection fraction of your heart.   - Syncope happens when the brain temporarily doesn't get enough blood. One of the most common reasons this happens is called "vasovagal syncope." If you have vasovagal syncope, your body has a reaction in which your heart beats too slowly or your blood vessels expand (or both). This can happen for lots of different kinds of reasons. People can have vasovagal syncope if they are dehydrated or don't drink enough water. You most likely had this syncopal episode from dehydration after you had 5 episodes of diarrhea. Please stay hydrated and ensure you drink enough water.       PRINCIPAL DISCHARGE DIAGNOSIS  Diagnosis: Syncope  Assessment and Plan of Treatment: - You presented to the hospital after you had a syncopal episode or in other words fainted. Multiple tests were performed while you were in the hospital. You DID NOT have a heart attack and NO arrhythmias or irregular heart beats were noted on telemetry. An echocardiogram or ultrasound of your heart was performed which showed a normal pumping function or ejection fraction of your heart.   - Syncope happens when the brain temporarily doesn't get enough blood. One of the most common reasons this happens is called "vasovagal syncope." If you have vasovagal syncope, your body has a reaction in which your heart beats too slowly or your blood vessels expand (or both). This can happen for lots of different kinds of reasons. People can have vasovagal syncope if they are dehydrated or don't drink enough water. You most likely had this syncopal episode from dehydration after you had 5 episodes of diarrhea. Please stay hydrated and ensure you drink enough water.      SECONDARY DISCHARGE DIAGNOSES  Diagnosis: Vertigo  Assessment and Plan of Treatment: - You have a known history of vertigo which is a type of dizziness that makes you feel like you are spinning, swaying, or tilting, or like the room is moving around you. These feelings come and go, and might last seconds, hours, or days. You might feel worse when you move your head, change positions, cough, or sneeze.  - Please take Meclizine as needed for dizziness.     PRINCIPAL DISCHARGE DIAGNOSIS  Diagnosis: Syncope  Assessment and Plan of Treatment: - You presented to the hospital after you had a syncopal episode or in other words fainted. Multiple tests were performed while you were in the hospital. You DID NOT have a heart attack and NO arrhythmias or irregular heart beats were noted on telemetry. An echocardiogram or ultrasound of your heart was performed which showed a normal pumping function or ejection fraction of your heart.   - Syncope happens when the brain temporarily doesn't get enough blood. One of the most common reasons this happens is called "vasovagal syncope." If you have vasovagal syncope, your body has a reaction in which your heart beats too slowly or your blood vessels expand (or both). This can happen for lots of different kinds of reasons. People can have vasovagal syncope if they are dehydrated or don't drink enough water. You most likely had this syncopal episode from dehydration after you had 5 episodes of diarrhea. Please stay hydrated and ensure you drink enough water.      SECONDARY DISCHARGE DIAGNOSES  Diagnosis: Vertigo  Assessment and Plan of Treatment: - You have a known history of vertigo which is a type of dizziness that makes you feel like you are spinning, swaying, or tilting, or like the room is moving around you. These feelings come and go, and might last seconds, hours, or days. You might feel worse when you move your head, change positions, cough, or sneeze.  - Please take Meclizine as needed for dizziness.    Diagnosis: Diarrhea  Assessment and Plan of Treatment: You were started on a medication called Cholestyramine . This medication helps remove excess bile acid from the intestines, which relieves the symptoms of diarrhea associated. Please follow up with your GI doctor when you return to [place].

## 2022-04-01 NOTE — DISCHARGE NOTE NURSING/CASE MANAGEMENT/SOCIAL WORK - NSDCPEFALRISK_GEN_ALL_CORE
For information on Fall & Injury Prevention, visit: [email or web address] OR  [email or web address] OR  https://www.cdc.gov/steadi/patient.html

## 2022-04-01 NOTE — DISCHARGE NOTE NURSING/CASE MANAGEMENT/SOCIAL WORK - PATIENT PORTAL LINK FT
You can access the FollowMyHealth Patient Portal offered by [facility] by registering at the following website: [email or web address]. By joining [employer]’s FollowMyHealth portal, you will also be able to view your health information using other applications (apps) compatible with our system.

## 2022-04-01 NOTE — PATIENT PROFILE ADULT - FALL HARM RISK - HARM RISK INTERVENTIONS

## 2022-04-06 DIAGNOSIS — K52.9 NONINFECTIVE GASTROENTERITIS AND COLITIS, UNSPECIFIED: ICD-10-CM

## 2022-04-06 DIAGNOSIS — R77.8 OTHER SPECIFIED ABNORMALITIES OF PLASMA PROTEINS: ICD-10-CM

## 2022-04-06 DIAGNOSIS — I10 ESSENTIAL (PRIMARY) HYPERTENSION: ICD-10-CM

## 2022-04-06 DIAGNOSIS — Z87.891 PERSONAL HISTORY OF NICOTINE DEPENDENCE: ICD-10-CM

## 2022-04-06 DIAGNOSIS — Z79.51 LONG TERM (CURRENT) USE OF INHALED STEROIDS: ICD-10-CM

## 2022-04-06 DIAGNOSIS — J45.909 UNSPECIFIED ASTHMA, UNCOMPLICATED: ICD-10-CM

## 2022-04-06 DIAGNOSIS — Z79.84 LONG TERM (CURRENT) USE OF ORAL HYPOGLYCEMIC DRUGS: ICD-10-CM

## 2022-04-06 DIAGNOSIS — F32.A DEPRESSION, UNSPECIFIED: ICD-10-CM

## 2022-04-06 DIAGNOSIS — R42 DIZZINESS AND GIDDINESS: ICD-10-CM

## 2022-04-06 DIAGNOSIS — R55 SYNCOPE AND COLLAPSE: ICD-10-CM

## 2022-04-06 DIAGNOSIS — E11.9 TYPE 2 DIABETES MELLITUS WITHOUT COMPLICATIONS: ICD-10-CM

## 2022-04-06 DIAGNOSIS — E78.5 HYPERLIPIDEMIA, UNSPECIFIED: ICD-10-CM

## 2022-04-06 DIAGNOSIS — Z88.0 ALLERGY STATUS TO PENICILLIN: ICD-10-CM

## 2022-04-06 DIAGNOSIS — E86.0 DEHYDRATION: ICD-10-CM

## 2022-06-06 NOTE — DISCHARGE NOTE PROVIDER - NSDCHC_MEDRECSTATUS_GEN_ALL_CORE
Medical Necessity Information: It is in your best interest to select a reason for this procedure from the list below. All of these items fulfill various CMS LCD requirements except the new and changing color options. Show Topical Anesthesia Variable?: Yes Render Post-Care Instructions In Note?: no Admission Reconciliation is Completed  Discharge Reconciliation is Not Complete Detail Level: Simple Duration Of Freeze Thaw-Cycle (Seconds): 0 Admission Reconciliation is Completed  Discharge Reconciliation is Completed Consent: The patient's consent was obtained including but not limited to risks of crusting, scabbing, blistering, scarring, darker or lighter pigmentary change, recurrence, incomplete removal and infection. Post-Care Instructions: I reviewed with the patient in detail post-care instructions. Patient is to wear sunprotection, and avoid picking at any of the treated lesions. Pt may apply Vaseline to crusted or scabbing areas. Medical Necessity Clause: This procedure was medically necessary because the lesions that were treated were: Detail Level: Detailed Spray Paint Text: The liquid nitrogen was applied to the skin utilizing a spray paint frosting technique. Total Number Of Aks Treated: 2

## 2022-06-07 NOTE — ED ADULT NURSE NOTE - NSFALLRSKHARMRISK_ED_ALL_ED
Follow-up with your primary doctor.  Return to the emergency room for any new or worsening symptoms or if you have any other questions or concerns.  Take medication as prescribed.  
no

## 2023-01-09 ENCOUNTER — EMERGENCY (EMERGENCY)
Facility: HOSPITAL | Age: 68
LOS: 1 days | Discharge: HOME | End: 2023-01-09
Attending: EMERGENCY MEDICINE | Admitting: EMERGENCY MEDICINE
Payer: COMMERCIAL

## 2023-01-09 VITALS
RESPIRATION RATE: 18 BRPM | DIASTOLIC BLOOD PRESSURE: 55 MMHG | TEMPERATURE: 98 F | OXYGEN SATURATION: 99 % | SYSTOLIC BLOOD PRESSURE: 91 MMHG | HEART RATE: 70 BPM

## 2023-01-09 VITALS
HEART RATE: 72 BPM | RESPIRATION RATE: 17 BRPM | SYSTOLIC BLOOD PRESSURE: 145 MMHG | OXYGEN SATURATION: 99 % | DIASTOLIC BLOOD PRESSURE: 78 MMHG | TEMPERATURE: 98 F

## 2023-01-09 DIAGNOSIS — I44.0 ATRIOVENTRICULAR BLOCK, FIRST DEGREE: ICD-10-CM

## 2023-01-09 DIAGNOSIS — F32.A DEPRESSION, UNSPECIFIED: ICD-10-CM

## 2023-01-09 DIAGNOSIS — Z86.79 PERSONAL HISTORY OF OTHER DISEASES OF THE CIRCULATORY SYSTEM: ICD-10-CM

## 2023-01-09 DIAGNOSIS — Z82.49 FAMILY HISTORY OF ISCHEMIC HEART DISEASE AND OTHER DISEASES OF THE CIRCULATORY SYSTEM: ICD-10-CM

## 2023-01-09 DIAGNOSIS — E66.9 OBESITY, UNSPECIFIED: ICD-10-CM

## 2023-01-09 DIAGNOSIS — Z87.74 PERSONAL HISTORY OF (CORRECTED) CONGENITAL MALFORMATIONS OF HEART AND CIRCULATORY SYSTEM: ICD-10-CM

## 2023-01-09 DIAGNOSIS — Z79.84 LONG TERM (CURRENT) USE OF ORAL HYPOGLYCEMIC DRUGS: ICD-10-CM

## 2023-01-09 DIAGNOSIS — R11.0 NAUSEA: ICD-10-CM

## 2023-01-09 DIAGNOSIS — E11.9 TYPE 2 DIABETES MELLITUS WITHOUT COMPLICATIONS: ICD-10-CM

## 2023-01-09 DIAGNOSIS — E78.5 HYPERLIPIDEMIA, UNSPECIFIED: ICD-10-CM

## 2023-01-09 DIAGNOSIS — Z87.19 PERSONAL HISTORY OF OTHER DISEASES OF THE DIGESTIVE SYSTEM: ICD-10-CM

## 2023-01-09 DIAGNOSIS — Z98.890 OTHER SPECIFIED POSTPROCEDURAL STATES: Chronic | ICD-10-CM

## 2023-01-09 DIAGNOSIS — Z88.0 ALLERGY STATUS TO PENICILLIN: ICD-10-CM

## 2023-01-09 DIAGNOSIS — I11.9 HYPERTENSIVE HEART DISEASE WITHOUT HEART FAILURE: ICD-10-CM

## 2023-01-09 DIAGNOSIS — Z90.49 ACQUIRED ABSENCE OF OTHER SPECIFIED PARTS OF DIGESTIVE TRACT: Chronic | ICD-10-CM

## 2023-01-09 DIAGNOSIS — Z90.49 ACQUIRED ABSENCE OF OTHER SPECIFIED PARTS OF DIGESTIVE TRACT: ICD-10-CM

## 2023-01-09 DIAGNOSIS — L80 VITILIGO: ICD-10-CM

## 2023-01-09 DIAGNOSIS — Z20.822 CONTACT WITH AND (SUSPECTED) EXPOSURE TO COVID-19: ICD-10-CM

## 2023-01-09 DIAGNOSIS — J45.901 UNSPECIFIED ASTHMA WITH (ACUTE) EXACERBATION: ICD-10-CM

## 2023-01-09 DIAGNOSIS — Z88.1 ALLERGY STATUS TO OTHER ANTIBIOTIC AGENTS STATUS: ICD-10-CM

## 2023-01-09 LAB
ALBUMIN SERPL ELPH-MCNC: 4 G/DL — SIGNIFICANT CHANGE UP (ref 3.3–5)
ALP SERPL-CCNC: 86 U/L — SIGNIFICANT CHANGE UP (ref 40–120)
ALT FLD-CCNC: 23 U/L — SIGNIFICANT CHANGE UP (ref 10–45)
ANION GAP SERPL CALC-SCNC: 12 MMOL/L — SIGNIFICANT CHANGE UP (ref 5–17)
APTT BLD: 26 SEC — LOW (ref 27.5–35.5)
AST SERPL-CCNC: 21 U/L — SIGNIFICANT CHANGE UP (ref 10–40)
BASOPHILS # BLD AUTO: 0.01 K/UL — SIGNIFICANT CHANGE UP (ref 0–0.2)
BASOPHILS NFR BLD AUTO: 0.1 % — SIGNIFICANT CHANGE UP (ref 0–2)
BILIRUB SERPL-MCNC: 0.4 MG/DL — SIGNIFICANT CHANGE UP (ref 0.2–1.2)
BUN SERPL-MCNC: 15 MG/DL — SIGNIFICANT CHANGE UP (ref 7–23)
CALCIUM SERPL-MCNC: 8.7 MG/DL — SIGNIFICANT CHANGE UP (ref 8.4–10.5)
CHLORIDE SERPL-SCNC: 102 MMOL/L — SIGNIFICANT CHANGE UP (ref 96–108)
CK MB CFR SERPL CALC: 5.6 NG/ML — SIGNIFICANT CHANGE UP (ref 0–6.7)
CK SERPL-CCNC: 189 U/L — HIGH (ref 25–170)
CO2 SERPL-SCNC: 26 MMOL/L — SIGNIFICANT CHANGE UP (ref 22–31)
CREAT SERPL-MCNC: 0.66 MG/DL — SIGNIFICANT CHANGE UP (ref 0.5–1.3)
EGFR: 96 ML/MIN/1.73M2 — SIGNIFICANT CHANGE UP
EOSINOPHIL # BLD AUTO: 0.04 K/UL — SIGNIFICANT CHANGE UP (ref 0–0.5)
EOSINOPHIL NFR BLD AUTO: 0.4 % — SIGNIFICANT CHANGE UP (ref 0–6)
GLUCOSE SERPL-MCNC: 108 MG/DL — HIGH (ref 70–99)
HCT VFR BLD CALC: 42.7 % — SIGNIFICANT CHANGE UP (ref 34.5–45)
HGB BLD-MCNC: 14.2 G/DL — SIGNIFICANT CHANGE UP (ref 11.5–15.5)
IMM GRANULOCYTES NFR BLD AUTO: 0.6 % — SIGNIFICANT CHANGE UP (ref 0–0.9)
INR BLD: 0.93 — SIGNIFICANT CHANGE UP (ref 0.88–1.16)
LYMPHOCYTES # BLD AUTO: 1.19 K/UL — SIGNIFICANT CHANGE UP (ref 1–3.3)
LYMPHOCYTES # BLD AUTO: 12.5 % — LOW (ref 13–44)
MCHC RBC-ENTMCNC: 27.7 PG — SIGNIFICANT CHANGE UP (ref 27–34)
MCHC RBC-ENTMCNC: 33.3 GM/DL — SIGNIFICANT CHANGE UP (ref 32–36)
MCV RBC AUTO: 83.4 FL — SIGNIFICANT CHANGE UP (ref 80–100)
MONOCYTES # BLD AUTO: 0.67 K/UL — SIGNIFICANT CHANGE UP (ref 0–0.9)
MONOCYTES NFR BLD AUTO: 7 % — SIGNIFICANT CHANGE UP (ref 2–14)
NEUTROPHILS # BLD AUTO: 7.57 K/UL — HIGH (ref 1.8–7.4)
NEUTROPHILS NFR BLD AUTO: 79.4 % — HIGH (ref 43–77)
NRBC # BLD: 0 /100 WBCS — SIGNIFICANT CHANGE UP (ref 0–0)
NT-PROBNP SERPL-SCNC: 628 PG/ML — HIGH (ref 0–300)
PLATELET # BLD AUTO: 244 K/UL — SIGNIFICANT CHANGE UP (ref 150–400)
POTASSIUM SERPL-MCNC: 3.8 MMOL/L — SIGNIFICANT CHANGE UP (ref 3.5–5.3)
POTASSIUM SERPL-SCNC: 3.8 MMOL/L — SIGNIFICANT CHANGE UP (ref 3.5–5.3)
PROT SERPL-MCNC: 6.7 G/DL — SIGNIFICANT CHANGE UP (ref 6–8.3)
PROTHROM AB SERPL-ACNC: 11.1 SEC — SIGNIFICANT CHANGE UP (ref 10.5–13.4)
RBC # BLD: 5.12 M/UL — SIGNIFICANT CHANGE UP (ref 3.8–5.2)
RBC # FLD: 13.2 % — SIGNIFICANT CHANGE UP (ref 10.3–14.5)
SARS-COV-2 RNA SPEC QL NAA+PROBE: NEGATIVE — SIGNIFICANT CHANGE UP
SODIUM SERPL-SCNC: 140 MMOL/L — SIGNIFICANT CHANGE UP (ref 135–145)
TROPONIN T SERPL-MCNC: <0.01 NG/ML — SIGNIFICANT CHANGE UP (ref 0–0.01)
WBC # BLD: 9.54 K/UL — SIGNIFICANT CHANGE UP (ref 3.8–10.5)
WBC # FLD AUTO: 9.54 K/UL — SIGNIFICANT CHANGE UP (ref 3.8–10.5)

## 2023-01-09 PROCEDURE — 93005 ELECTROCARDIOGRAM TRACING: CPT

## 2023-01-09 PROCEDURE — 99285 EMERGENCY DEPT VISIT HI MDM: CPT

## 2023-01-09 PROCEDURE — 85610 PROTHROMBIN TIME: CPT

## 2023-01-09 PROCEDURE — 85025 COMPLETE CBC W/AUTO DIFF WBC: CPT

## 2023-01-09 PROCEDURE — 82553 CREATINE MB FRACTION: CPT

## 2023-01-09 PROCEDURE — 99285 EMERGENCY DEPT VISIT HI MDM: CPT | Mod: 25

## 2023-01-09 PROCEDURE — 84484 ASSAY OF TROPONIN QUANT: CPT

## 2023-01-09 PROCEDURE — 80053 COMPREHEN METABOLIC PANEL: CPT

## 2023-01-09 PROCEDURE — 71045 X-RAY EXAM CHEST 1 VIEW: CPT

## 2023-01-09 PROCEDURE — 82550 ASSAY OF CK (CPK): CPT

## 2023-01-09 PROCEDURE — 71045 X-RAY EXAM CHEST 1 VIEW: CPT | Mod: 26

## 2023-01-09 PROCEDURE — 83880 ASSAY OF NATRIURETIC PEPTIDE: CPT

## 2023-01-09 PROCEDURE — 36415 COLL VENOUS BLD VENIPUNCTURE: CPT

## 2023-01-09 PROCEDURE — 87635 SARS-COV-2 COVID-19 AMP PRB: CPT

## 2023-01-09 PROCEDURE — 85730 THROMBOPLASTIN TIME PARTIAL: CPT

## 2023-01-09 RX ORDER — SODIUM CHLORIDE 9 MG/ML
500 INJECTION INTRAMUSCULAR; INTRAVENOUS; SUBCUTANEOUS ONCE
Refills: 0 | Status: COMPLETED | OUTPATIENT
Start: 2023-01-09 | End: 2023-01-09

## 2023-01-09 RX ADMIN — SODIUM CHLORIDE 500 MILLILITER(S): 9 INJECTION INTRAMUSCULAR; INTRAVENOUS; SUBCUTANEOUS at 22:47

## 2023-01-09 NOTE — ED PROVIDER NOTE - NSFOLLOWUPINSTRUCTIONS_ED_ALL_ED_FT
Please take Tylenol or Motrin for pain, body aches, and/or fever. Stay hydrated. Use a humidifier. Wear and mask, use hand , and wash hands frequently to avoid infecting others.     Viral Respiratory Infection    A viral respiratory infection is an illness that affects parts of the body used for breathing, like the lungs, nose, and throat. It is caused by a germ called a virus. Symptoms can include runny nose, coughing, sneezing, fatigue, body aches, sore throat, fever, or headache. Over the counter medicine can be used to manage the symptoms but the infection typically goes away on its own in 5 to 10 days.     SEEK IMMEDIATE MEDICAL CARE IF YOU HAVE ANY OF THE FOLLOWING SYMPTOMS: shortness of breath, chest pain, fever over 10 days, or lightheadedness/dizziness.      Upper Respiratory Infection, Adult    An upper respiratory infection (URI) affects the nose, throat, and upper air passages. URIs are caused by germs (viruses). The most common type of URI is often called "the common cold."    Medicines cannot cure URIs, but you can do things at home to relieve your symptoms. URIs usually get better within 7–10 days.    Follow these instructions at home:    Activity   •Rest as needed.  •If you have a fever, stay home from work or school until your fever is gone, or until your doctor says you may return to work or school.  •You should stay home until you cannot spread the infection anymore (you are not contagious).  •Your doctor may have you wear a face mask so you have less risk of spreading the infection.    Relieving symptoms   •Gargle with a salt-water mixture 3–4 times a day or as needed. To make a salt-water mixture, completely dissolve ½–1 tsp of salt in 1 cup of warm water.  •Use a cool-mist humidifier to add moisture to the air. This can help you breathe more easily.    Eating and drinking   •Drink enough fluid to keep your pee (urine) pale yellow.  •Eat soups and other clear broths.    General instructions   •Take over-the-counter and prescription medicines only as told by your doctor. These include cold medicines, fever reducers, and cough suppressants.  • Do not use any products that contain nicotine or tobacco. These include cigarettes and e-cigarettes. If you need help quitting, ask your doctor.  •Avoid being where people are smoking (avoid secondhand smoke).  •Make sure you get regular shots and get the flu shot every year.  •Keep all follow-up visits as told by your doctor. This is important.    How to avoid spreading infection to others   •Wash your hands often with soap and water. If you do not have soap and water, use hand .  •Avoid touching your mouth, face, eyes, or nose.  •Cough or sneeze into a tissue or your sleeve or elbow. Do not cough or sneeze into your hand or into the air.    Contact a doctor if:  •You are getting worse, not better.  •You have any of these:  •A fever.  •Chills.  •Brown or red mucus in your nose.  •Yellow or brown fluid (discharge)coming from your nose.  •Pain in your face, especially when you bend forward.  •Swollen neck glands.  •Pain with swallowing.  •White areas in the back of your throat.    Get help right away if:  •You have shortness of breath that gets worse.  •You have very bad or constant:  •Headache.  •Ear pain.  •Pain in your forehead, behind your eyes, and over your cheekbones (sinus pain).  •Chest pain.  •You have long-lasting (chronic) lung disease along with any of these:  •Wheezing.  •Long-lasting cough.  •Coughing up blood.  •A change in your usual mucus.  •You have a stiff neck.  •You have changes in your:  •Vision.  •Hearing.  •Thinking.  •Mood.    Summary  •An upper respiratory infection (URI) is caused by a germ called a virus. The most common type of URI is often called "the common cold."  •URIs usually get better within 7–10 days.  •Take over-the-counter and prescription medicines only as told by your doctor.    This information is not intended to replace advice given to you by your health care provider. Make sure you discuss any questions you have with your health care provider.    Asthma    Asthma is a condition in which the airways tighten and narrow, making it difficult to breath. Asthma episodes, also called asthma attacks, range from minor to life-threatening. Symptoms include wheezing, coughing, chest tightness, or shortness of breath. The diagnosis of asthma is made by a review of your medical history and a physical exam, but may involve additional testing. Asthma cannot be cured, but medicines and lifestyle changes can help control it. Avoid triggers of asthma which may include animal dander, pollen, mold, smoke, air pollutants, etc.     SEEK IMMEDIATE MEDICAL CARE IF YOU HAVE ANY OF THE FOLLOWING SYMPTOMS: worsening of symptoms, shortness of breath at rest, chest pain, bluish discoloration to lips or fingertips, lightheadedness/dizziness, or fever.

## 2023-01-09 NOTE — ED PROVIDER NOTE - OBJECTIVE STATEMENT
67F with PMHx of HTN, hyperlipidemia, DM, asthma (denies hospitalization/intubations), depression, hx of diverticulitis, hx ASD (spontaneously closed in her 20s), cholecystectomy 11/2021, who p/w chest pain 67F with PMHx of HTN, hyperlipidemia, DM, asthma (denies hospitalization/intubations), depression, hx of diverticulitis, hx ASD (spontaneously closed in her 20s), cholecystectomy 11/2021, who p/w nausea, cough, SOB, and chest discomfort. pt states she recently had flu-like sx one month ago, was treated with 5d of steroids and 7 days of clindamycin for possible infection. Her symptoms improved but then she felt flu-like sx again a few days ago, started herself on 5 days of prednisone again. She went to  today and was noted to be wheezy so given 80mg steroid shot and duoneb which greatly improved her breathing. She was told to come to ER for abnormal EKG. She was rx'd cefdinir for possible chest infection but has not picked it up yet. Currently denies CP, no f/c, no dizziness or syncope. No hx or FHx of CAD/VTE. She reports recent admission to  last year for syncope and was r/o for acs at that time.

## 2023-01-09 NOTE — ED ADULT NURSE NOTE - OBJECTIVE STATEMENT
pt. with pmh of HTN, HLD, asthma, DM, presents with c/o chest pain and nausea. pt. states she had "lung infection" a month ago, finished her abx and prednisone tx, but did not feel well today, she went to  from where she was sent to er because of the ekg abnormalities. ekg nsr, blood was sent.

## 2023-01-09 NOTE — ED PROVIDER NOTE - PATIENT PORTAL LINK FT
You can access the FollowMyHealth Patient Portal offered by [facility] by registering at the following website: [email or web address]. By joining [email or web address]’s FollowMyHealth portal, you will also be able to view your health information using other applications (apps) compatible with our system.

## 2023-01-09 NOTE — ED PROVIDER NOTE - CLINICAL SUMMARY MEDICAL DECISION MAKING FREE TEXT BOX
45F with a PMHx of right flank AVM s/p embolization with Dr. Mayo in the past, hx of palpitations in the past (has had holter monitors but never found an arrythmia) who p/w palpitations after drinking a green tea with vijay PTA. No CP, SOB, no dizziness or syncope. Report +FHx of mother with afib. She denies other caffeine or coningestants, No hx or FHx of CAD/VTE.  Prior EMR notes as well as external notes reviewed. Case discussed with available family member.  Pt is chronically ill-appearing on exam, VSS, no respiratory distress, no focal neuro deficits. Per my interpretation: EKG NSR, no stemi, TWI V4-6.   Clinical picture c/w asthma exaceration, uri, low suspicion for ACS but will check CE. Do not suspect PE, dissection or other acute life-threatening pathology at this time.   Independent interpretation of labs and imaging performed by me. Trop neg. Pt hydrated.   CXR neg for pna. Pt feeling improved and is stable for DC. To continue meds Rx by . ED evaluation and management discussed with the patient in detail.  Close PMD follow up encouraged.  Strict ED return instructions discussed in detail and patient given the opportunity to ask any questions about their discharge diagnosis and instructions. Patient verbalized understanding.

## 2023-01-09 NOTE — ED PROVIDER NOTE - PHYSICAL EXAMINATION
GEN: Obese, chronically ill appearing, well developed, awake, alert, oriented to person, place, time/situation and in no apparent distress. NTAF  ENT: Airway patent, Nasal mucosa clear. Mouth with normal mucosa.  EYES: Clear bilaterally. PERRL, EOMI  RESPIRATORY: Breathing comfortably with normal RR. Scattered end expiratory wheezes.   CARDIAC: Regular rate and rhythm, no M/R/G  ABDOMEN: Soft, nontender, +bowel sounds, no rebound, rigidity, or guarding.  MSK: Range of motion is not limited, no deformities noted.  NEURO: Alert and oriented, no focal deficits.  SKIN: Skin normal color for race, +vitiligo, warm, dry and intact. No evidence of rash.  PSYCH: Alert and oriented to person, place, time/situation. normal mood and affect. no apparent risk to self or others.

## 2023-01-09 NOTE — ED ADULT TRIAGE NOTE - ARRIVAL INFO ADDITIONAL COMMENTS
pt c/o chest pain.   has had a "chest infection" and went to UC today because she is feeling worse and was sent here for an abnormal EKG

## 2023-01-10 PROBLEM — I10 ESSENTIAL (PRIMARY) HYPERTENSION: Chronic | Status: ACTIVE | Noted: 2022-03-31

## 2023-01-10 PROBLEM — Z86.59 PERSONAL HISTORY OF OTHER MENTAL AND BEHAVIORAL DISORDERS: Chronic | Status: ACTIVE | Noted: 2022-03-31

## 2023-01-10 PROBLEM — E78.5 HYPERLIPIDEMIA, UNSPECIFIED: Chronic | Status: ACTIVE | Noted: 2022-03-31

## 2023-01-10 PROBLEM — Q21.1 ATRIAL SEPTAL DEFECT: Chronic | Status: ACTIVE | Noted: 2022-04-01

## 2023-01-10 PROBLEM — K57.92 DIVERTICULITIS OF INTESTINE, PART UNSPECIFIED, WITHOUT PERFORATION OR ABSCESS WITHOUT BLEEDING: Chronic | Status: ACTIVE | Noted: 2022-03-31

## 2023-09-14 NOTE — ED PROVIDER NOTE - WR ORDER ID 1
Dupixent Pregnancy And Lactation Text: This medication likely crosses the placenta but the risk for the fetus is uncertain. This medication is excreted in breast milk. [id number]

## 2025-07-08 NOTE — ED ADULT NURSE NOTE - IS THE PATIENT ABLE TO BE SCREENED?
For your hand symptoms:    Rest the hand and fingers when possible  Avoid overuse  Take the Prednisone as prescribed.   Elevate when possible  Ice for the first 48 hours then switch to heat    Call or return to the clinic or your regular doctor if symptoms worsen, or new symptoms develop.     Yes